# Patient Record
Sex: MALE
[De-identification: names, ages, dates, MRNs, and addresses within clinical notes are randomized per-mention and may not be internally consistent; named-entity substitution may affect disease eponyms.]

---

## 2019-07-08 ENCOUNTER — HOSPITAL ENCOUNTER (OUTPATIENT)
Dept: HOSPITAL 25 - OREAST | Age: 79
Discharge: HOME | End: 2019-07-08
Attending: OPHTHALMOLOGY
Payer: MEDICARE

## 2019-07-08 VITALS — SYSTOLIC BLOOD PRESSURE: 137 MMHG | DIASTOLIC BLOOD PRESSURE: 70 MMHG

## 2019-07-08 DIAGNOSIS — H25.11: Primary | ICD-10-CM

## 2019-07-08 DIAGNOSIS — E11.9: ICD-10-CM

## 2019-07-08 DIAGNOSIS — Z79.84: ICD-10-CM

## 2019-07-08 DIAGNOSIS — G20: ICD-10-CM

## 2019-07-08 PROCEDURE — V2632 POST CHMBR INTRAOCULAR LENS: HCPCS

## 2019-07-08 NOTE — OP
DATE OF OPERATION:  07/08/2019 - [facility]

 

YOB: 1940.

 

SURGEON:  David Robertson MD

 

ANESTHESIA:  Monitored anesthesia care.

 

PREOPERATIVE DIAGNOSIS:  Cataract, right eye.

 

POSTOPERATIVE DIAGNOSIS:  Cataract, right eye.

 

OPERATIVE PROCEDURE:  Extracapsular cataract extraction of the right eye with 
intraocular lens implant.

 

IMPLANT:  SN60WF 20.5 diopter lens to the right eye.

 

COMPLICATIONS:  None.

 

DESCRIPTION OF PROCEDURE:  The patient was given phenylephrine 2.5 % and 
cyclopentolate 1% eye drops to the operative eye in the preoperative area. The 
patient was taken to the operating room where a time-out was taken to identify 
the correct patient, site, and side of surgery. The patient's right eye was 
prepped and draped in the usual sterile fashion with 5% Betadine. A second time-
out was taken to verify the correct patient, side, and site of surgery, as well 
as the correct lens implant. A lid speculum was placed to the right eye. A 1mm 
paracentesis blade was used to make a clear corneal incision. Preservative-free 
1% lidocaine was injected into the anterior chamber. DisCoVisc was then 
injected into the anterior chamber. A 2.75 mm keratome blade was used to make a 
triplanar incision. A cystotome initiated a capsulorrhexis, which was completed 
with Utrata forceps in a continuous and curvilinear manner. Hydrodissection of 
the lens was performed with BSS on a cannula. The lens could be spun in a 
capsular bag. The phacoemulsification handpiece was used with a divide-and-
conquer technique to remove the nucleus. The I/A handpiece then removed the 
residual cortical lens material. DisCoVisc was injected to inflate the capsular 
bag. The planned SN60WF 20.5 diopter lens was injected into the capsular bag. 
The residual DisCoVisc was removed from the eye with the I/A handpiece. The 
corneal incisions were hydrated and no leaks occurred at physiologic pressure 
around 20 mmHg per palpation. The lid speculum was removed and drapes were 
removed. Maxitrol ointment was placed to the surface of the operative eye. An 
adhesive patch and shield was then placed on the operative eye. The patient was 
taken to the postoperative area in stable condition.

 

 [id number]/CPS #: [id number]

[facility]

## 2019-07-15 ENCOUNTER — HOSPITAL ENCOUNTER (OUTPATIENT)
Dept: HOSPITAL 25 - OREAST | Age: 79
Discharge: HOME | End: 2019-07-15
Attending: OPHTHALMOLOGY
Payer: MEDICARE

## 2019-07-15 VITALS — SYSTOLIC BLOOD PRESSURE: 132 MMHG | DIASTOLIC BLOOD PRESSURE: 71 MMHG

## 2019-07-15 DIAGNOSIS — H25.12: Primary | ICD-10-CM

## 2019-07-15 DIAGNOSIS — Z79.84: ICD-10-CM

## 2019-07-15 DIAGNOSIS — E11.9: ICD-10-CM

## 2019-07-15 DIAGNOSIS — G20: ICD-10-CM

## 2019-07-15 PROCEDURE — V2632 POST CHMBR INTRAOCULAR LENS: HCPCS

## 2019-07-15 NOTE — OP
DATE OF OPERATION:  07/15/2019 - [facility]

 

YOB: 1940.

 

SURGEON:  Daivd Robertson MD

 

ANESTHESIA:  Monitored anesthesia care.

 

PREOPERATIVE DIAGNOSIS:  Cataract, left eye.

 

POSTOPERATIVE DIAGNOSIS:  Cataract, left eye.

 

OPERATIVE PROCEDURE:  Extracapsular cataract extraction of the left eye with 
intraocular lens implant.

 

IMPLANT:  SN60WF 19.5 diopter lens to the left eye.

 

COMPLICATIONS:  None.

 

DESCRIPTION OF PROCEDURE:  The patient was given phenylephrine 2.5 % and 
cyclopentolate 1% eye drops to the operative eye in the preoperative area. The 
patient was taken to the operating room where a time-out was taken to identify 
the correct patient, site, and side of surgery. The patient's left eye was 
prepped and draped in the usual sterile fashion with 5% Betadine. A second time-
out was taken to verify the correct patient, side, and site of surgery, as well 
as the correct lens implant. A lid speculum was placed to the left eye. A 1mm 
paracentesis blade was used to make a clear corneal incision. Preservative-free 
1% lidocaine was injected into the anterior chamber. DisCoVisc was then 
injected into the anterior chamber. A 2.75 mm keratome blade was used to make a 
triplanar incision. A cystotome initiated a capsulorrhexis, which was completed 
with Utrata forceps in a continuous and curvilinear manner. Hydrodissection of 
the lens was performed with BSS on a cannula. The lens could be spun in a 
capsular bag. The phacoemulsification handpiece was used with a divide-and-
conquer technique to remove the nucleus. The I/A handpiece then removed the 
residual cortical lens material. DisCoVisc was injected to inflate the capsular 
bag. The planned SN60WF 19.5 diopter lens was injected into the capsular bag. 
The residual DisCoVisc was removed from the eye with the I/A handpiece. The 
corneal incisions were hydrated and no leaks occurred at physiologic pressure 
around 20 mmHg per palpation. The lid speculum was removed and drapes were 
removed. Maxitrol ointment was placed to the surface of the operative eye. An 
adhesive patch and shield was then placed on the operative eye. The patient was 
taken to the postoperative area in stable condition.

 

 [id number]/CPS #: [id number]

[facility]